# Patient Record
(demographics unavailable — no encounter records)

---

## 2025-06-09 NOTE — HEALTH RISK ASSESSMENT
[No] : No [Very Good] : ~his/her~  mood as very good [0] : 1) Little interest or pleasure doing things: Not at all (0) [PHQ-2 Negative - No further assessment needed] : PHQ-2 Negative - No further assessment needed [Never] : Never [Fully functional (bathing, dressing, toileting, transferring, walking, feeding)] : Fully functional (bathing, dressing, toileting, transferring, walking, feeding) [Fully functional (using the telephone, shopping, preparing meals, housekeeping, doing laundry, using] : Fully functional and needs no help or supervision to perform IADLs (using the telephone, shopping, preparing meals, housekeeping, doing laundry, using transportation, managing medications and managing finances) [Never (0 pts)] : Never (0 points) [de-identified] : walking for exercise [de-identified] : Tries to eat healthy [[id number]] : 0 [Reports changes in dental health] : Reports no changes in dental health [de-identified] : Patient sees a dentist regularly

## 2025-06-09 NOTE — PLAN
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

## 2025-06-09 NOTE — HISTORY OF PRESENT ILLNESS
[FreeTextEntry1] : Here for CPE  [de-identified] : Has not been here in over 2-1/2 years Generally feels well Living at home with his parents Working with autistic adults - in media  Psychiatry is tapering him off Pristiq and going up on Effexor  No new complaints